# Patient Record
Sex: FEMALE | Race: WHITE | NOT HISPANIC OR LATINO | URBAN - METROPOLITAN AREA
[De-identification: names, ages, dates, MRNs, and addresses within clinical notes are randomized per-mention and may not be internally consistent; named-entity substitution may affect disease eponyms.]

---

## 2021-10-18 ENCOUNTER — NEW PATIENT COMPREHENSIVE (OUTPATIENT)
Dept: URBAN - METROPOLITAN AREA CLINIC 49 | Facility: CLINIC | Age: 76
End: 2021-10-18

## 2021-10-18 DIAGNOSIS — H02.831: ICD-10-CM

## 2021-10-18 DIAGNOSIS — H35.373: ICD-10-CM

## 2021-10-18 DIAGNOSIS — H35.362: ICD-10-CM

## 2021-10-18 DIAGNOSIS — H02.834: ICD-10-CM

## 2021-10-18 PROCEDURE — 92134 CPTRZ OPH DX IMG PST SGM RTA: CPT

## 2021-10-18 PROCEDURE — 99204 OFFICE O/P NEW MOD 45 MIN: CPT

## 2021-10-18 PROCEDURE — 92285 EXTERNAL OCULAR PHOTOGRAPHY: CPT

## 2021-10-18 ASSESSMENT — VISUAL ACUITY
OU_CC: J1+ @ 14IN
OD_CC: 20/20
OS_GLARE: 20/30
OS_CC: 20/25+1
OS_CC: J1 @ 14IN
OD_GLARE: 20/30
OS_GLARE: 20/50
OD_CC: J1+ @ 14IN
OD_GLARE: 20/60
OU_CC: 20/20-1

## 2021-10-18 ASSESSMENT — TONOMETRY
OS_IOP_MMHG: 17
OD_IOP_MMHG: 16

## 2021-10-18 NOTE — PATIENT DISCUSSION
Recommend patient see retina DR, patient states she has already seen [facility]. Called and patient has appointment.

## 2021-10-18 NOTE — PATIENT DISCUSSION
D/w patient we need to get her taken care of with the [facility] retinal tear resolved before moving forward with lids.